# Patient Record
Sex: FEMALE
[De-identification: names, ages, dates, MRNs, and addresses within clinical notes are randomized per-mention and may not be internally consistent; named-entity substitution may affect disease eponyms.]

---

## 2015-03-13 RX ORDER — CLOBETASOL PROPIONATE 0.5 MG/G
1 APPLICATION OINTMENT TOPICAL BID
Qty: 15 | Refills: 0 | Status: DISCONTINUED
Start: 2015-03-13 | End: 2019-04-02

## 2015-03-13 RX ORDER — MINOCYCLINE HYDROCHLORIDE 100 MG/1
1 CAPSULE CAPSULE ORAL BID
Qty: 28 | Refills: 0 | Status: DISCONTINUED
Start: 2015-03-13 | End: 2019-04-02

## 2019-02-27 ENCOUNTER — RX ONLY (RX ONLY)
Age: 64
End: 2019-02-27

## 2019-02-27 ENCOUNTER — OTHER (OUTPATIENT)
Dept: URBAN - METROPOLITAN AREA CLINIC 18 | Facility: CLINIC | Age: 64
Setting detail: DERMATOLOGY
End: 2019-02-27

## 2019-02-27 DIAGNOSIS — L82.0 INFLAMED SEBORRHEIC KERATOSIS: ICD-10-CM

## 2019-02-27 PROBLEM — L21.8 OTHER SEBORRHEIC DERMATITIS: Status: RESOLVED | Noted: 2019-02-27

## 2019-02-27 PROBLEM — D18.01 HEMANGIOMA OF SKIN AND SUBCUTANEOUS TISSUE: Status: RESOLVED | Noted: 2019-02-27

## 2019-02-27 PROBLEM — L82.1 OTHER SEBORRHEIC KERATOSIS: Status: RESOLVED | Noted: 2019-02-27

## 2019-02-27 PROBLEM — L85.3 XEROSIS CUTIS: Status: RESOLVED | Noted: 2019-02-27

## 2019-02-27 PROBLEM — L57.0 ACTINIC KERATOSIS: Status: RESOLVED | Noted: 2019-02-27

## 2019-02-27 PROCEDURE — 11103 TANGNTL BX SKIN EA SEP/ADDL: CPT

## 2019-02-27 PROCEDURE — 17003 DESTRUCT PREMALG LES 2-14: CPT

## 2019-02-27 PROCEDURE — 17000 DESTRUCT PREMALG LESION: CPT

## 2019-02-27 PROCEDURE — 11102 TANGNTL BX SKIN SINGLE LES: CPT

## 2019-02-27 PROCEDURE — 99203 OFFICE O/P NEW LOW 30 MIN: CPT

## 2019-02-27 RX ORDER — KETOCONAZOLE 20 MG/ML
1 APPLICATION SHAMPOO, SUSPENSION TOPICAL 3 TIMES WEEKLY
Qty: 120 | Refills: 3
Start: 2019-02-27

## 2019-02-27 RX ORDER — DESONIDE 0.5 MG/G
1 APPLICATION CREAM TOPICAL BID
Qty: 60 | Refills: 0
Start: 2019-02-27

## 2019-03-07 ENCOUNTER — CRYOTHERAPY (OUTPATIENT)
Dept: URBAN - METROPOLITAN AREA CLINIC 16 | Facility: CLINIC | Age: 64
Setting detail: DERMATOLOGY
End: 2019-03-07

## 2019-03-07 ENCOUNTER — RX ONLY (RX ONLY)
Age: 64
End: 2019-03-07

## 2019-03-07 DIAGNOSIS — L57.0 ACTINIC KERATOSIS: ICD-10-CM

## 2019-03-07 PROCEDURE — 17003 DESTRUCT PREMALG LES 2-14: CPT

## 2019-03-07 PROCEDURE — 17000 DESTRUCT PREMALG LESION: CPT

## 2019-03-07 RX ORDER — MUPIROCIN 20 MG/G
1 APPLICATION OINTMENT TOPICAL BID
Qty: 30 | Refills: 0
Start: 2019-03-07

## 2020-09-28 ENCOUNTER — COMPLETE SKIN EXAM (OUTPATIENT)
Dept: URBAN - METROPOLITAN AREA CLINIC 18 | Facility: CLINIC | Age: 65
Setting detail: DERMATOLOGY
End: 2020-09-28

## 2020-09-28 DIAGNOSIS — L57.0 ACTINIC KERATOSIS: ICD-10-CM

## 2020-09-28 PROBLEM — L82.1 OTHER SEBORRHEIC KERATOSIS: Status: RESOLVED | Noted: 2020-09-28

## 2020-09-28 PROBLEM — D18.01 HEMANGIOMA OF SKIN AND SUBCUTANEOUS TISSUE: Status: RESOLVED | Noted: 2020-09-28

## 2020-09-28 PROCEDURE — 99213 OFFICE O/P EST LOW 20 MIN: CPT

## 2023-02-08 ENCOUNTER — APPOINTMENT (OUTPATIENT)
Dept: URBAN - METROPOLITAN AREA SURGERY 11 | Age: 68
Setting detail: DERMATOLOGY
End: 2023-02-08

## 2023-02-08 DIAGNOSIS — D22 MELANOCYTIC NEVI: ICD-10-CM

## 2023-02-08 DIAGNOSIS — L82.1 OTHER SEBORRHEIC KERATOSIS: ICD-10-CM

## 2023-02-08 DIAGNOSIS — L57.0 ACTINIC KERATOSIS: ICD-10-CM

## 2023-02-08 DIAGNOSIS — L81.4 OTHER MELANIN HYPERPIGMENTATION: ICD-10-CM

## 2023-02-08 PROBLEM — D23.39 OTHER BENIGN NEOPLASM OF SKIN OF OTHER PARTS OF FACE: Status: ACTIVE | Noted: 2023-02-08

## 2023-02-08 PROBLEM — D22.5 MELANOCYTIC NEVI OF TRUNK: Status: ACTIVE | Noted: 2023-02-08

## 2023-02-08 PROCEDURE — OTHER COUNSELING: OTHER

## 2023-02-08 PROCEDURE — 17000 DESTRUCT PREMALG LESION: CPT

## 2023-02-08 PROCEDURE — 99213 OFFICE O/P EST LOW 20 MIN: CPT | Mod: 25

## 2023-02-08 PROCEDURE — OTHER MIPS QUALITY: OTHER

## 2023-02-08 PROCEDURE — OTHER LIQUID NITROGEN: OTHER

## 2023-02-08 PROCEDURE — 17003 DESTRUCT PREMALG LES 2-14: CPT

## 2023-02-08 ASSESSMENT — LOCATION ZONE DERM
LOCATION ZONE: NOSE
LOCATION ZONE: FACE
LOCATION ZONE: ARM
LOCATION ZONE: TRUNK

## 2023-02-08 ASSESSMENT — LOCATION DETAILED DESCRIPTION DERM
LOCATION DETAILED: NASAL DORSUM
LOCATION DETAILED: SUBXIPHOID
LOCATION DETAILED: RIGHT LATERAL FOREHEAD
LOCATION DETAILED: LEFT LATERAL EYEBROW
LOCATION DETAILED: RIGHT MEDIAL SUPERIOR CHEST
LOCATION DETAILED: LEFT VENTRAL PROXIMAL FOREARM
LOCATION DETAILED: PERIUMBILICAL SKIN

## 2023-02-08 ASSESSMENT — LOCATION SIMPLE DESCRIPTION DERM
LOCATION SIMPLE: ABDOMEN
LOCATION SIMPLE: NOSE
LOCATION SIMPLE: LEFT FOREARM
LOCATION SIMPLE: LEFT EYEBROW
LOCATION SIMPLE: CHEST
LOCATION SIMPLE: RIGHT FOREHEAD

## 2023-02-08 NOTE — PROCEDURE: COUNSELING
Detail Level: Generalized
Patient Specific Counseling (Will Not Stick From Patient To Patient): Including spots of concern
Detail Level: Detailed

## 2023-02-08 NOTE — PROCEDURE: LIQUID NITROGEN
Render Post-Care Instructions In Note?: no
Detail Level: Detailed
Show Applicator Variable?: Yes
Consent: The patient's consent was obtained including but not limited to risks of crusting, scabbing, blistering, scarring, darker or lighter pigmentary change, recurrence, incomplete removal and infection.
Duration Of Freeze Thaw-Cycle (Seconds): 15
Post-Care Instructions: I reviewed with the patient in detail post-care instructions. Patient is to wear sunprotection, and avoid picking at any of the treated lesions. Pt may apply Vaseline to crusted or scabbing areas.